# Patient Record
(demographics unavailable — no encounter records)

---

## 2025-01-08 NOTE — CONSULT LETTER
[Dear  ___] : Dear  [unfilled], [Consult Letter:] : I had the pleasure of evaluating your patient, [unfilled]. [Please see my note below.] : Please see my note below. [Consult Closing:] : Thank you very much for allowing me to participate in the care of this patient.  If you have any questions, please do not hesitate to contact me. [Sincerely,] : Sincerely, [FreeTextEntry3] : Ameya Wei MD [facility] Associated Professor, Stephanie

## 2025-01-08 NOTE — PHYSICAL EXAM
[Midline] : trachea located in midline position [Normal] : no rashes [de-identified] : there is mild tenderness and mild swelling at the area of the right parotid gland [de-identified] : cryptic bilaterally

## 2025-01-08 NOTE — HISTORY OF PRESENT ILLNESS
[de-identified] : For the last few months patient has recurrent issues with throat inflammation and irritation She does pick out her tonsil stones all the time as well. Whenever she looks back there she has redness.  She does not have a history of strep but she frequently has throat soreness and irritation. Two days ago specifically she started to get a cold and the throat soreness started with a cough as well. She also got a very large bump on the right side of the jaw. The swelling improved over the last two days with antibiotics that she Started yesterday. THe pain and soreness has imroved as well. This is the first time that this has happened. No fevers or chills

## 2025-01-08 NOTE — ASSESSMENT
[FreeTextEntry1] : Patient 23-year-old female with right swelling on the right side was started on Augmentin comes in for evaluation has a history of tonsil stones on examination her tonsils are cryptic but they are small she does not get strep infections she has a slight swelling of her right salivary gland consistent with parotitis is no longer tender encouraged her to stay hydrated suck on lemon drops finished a course of antibiotics no further acute interventions indicated.

## 2025-01-08 NOTE — END OF VISIT
[FreeTextEntry3] : I, Dr. Wei personally performed the evaluation and management (E/M) services including all necessary procedures, for this new patient. That E/M includes conducting the clinically appropriate initial history &/or exam, assessing all conditions, and establishing the plan of care. Today, my DELFINA, Vanessa Jacobo, was here to observe &/or participate in the visit & follow plan of care established by me.